# Patient Record
Sex: MALE | Race: OTHER | NOT HISPANIC OR LATINO
[De-identification: names, ages, dates, MRNs, and addresses within clinical notes are randomized per-mention and may not be internally consistent; named-entity substitution may affect disease eponyms.]

---

## 2017-01-13 ENCOUNTER — TRANSCRIPTION ENCOUNTER (OUTPATIENT)
Age: 62
End: 2017-01-13

## 2017-03-16 ENCOUNTER — OUTPATIENT (OUTPATIENT)
Dept: OUTPATIENT SERVICES | Facility: HOSPITAL | Age: 62
LOS: 1 days | End: 2017-03-16

## 2017-03-16 ENCOUNTER — APPOINTMENT (OUTPATIENT)
Dept: CV DIAGNOSITCS | Facility: HOSPITAL | Age: 62
End: 2017-03-16

## 2017-03-16 DIAGNOSIS — I10 ESSENTIAL (PRIMARY) HYPERTENSION: ICD-10-CM

## 2017-03-16 DIAGNOSIS — I77.810 THORACIC AORTIC ECTASIA: ICD-10-CM

## 2017-03-20 ENCOUNTER — APPOINTMENT (OUTPATIENT)
Dept: OTOLARYNGOLOGY | Facility: CLINIC | Age: 62
End: 2017-03-20

## 2017-03-20 VITALS
HEART RATE: 73 BPM | OXYGEN SATURATION: 98 % | HEIGHT: 68 IN | BODY MASS INDEX: 26.83 KG/M2 | TEMPERATURE: 97.9 F | WEIGHT: 177 LBS | DIASTOLIC BLOOD PRESSURE: 80 MMHG | SYSTOLIC BLOOD PRESSURE: 142 MMHG

## 2017-03-20 DIAGNOSIS — Z83.3 FAMILY HISTORY OF DIABETES MELLITUS: ICD-10-CM

## 2017-03-20 DIAGNOSIS — Z80.9 FAMILY HISTORY OF MALIGNANT NEOPLASM, UNSPECIFIED: ICD-10-CM

## 2017-03-20 DIAGNOSIS — H72.91 UNSPECIFIED PERFORATION OF TYMPANIC MEMBRANE, RIGHT EAR: ICD-10-CM

## 2017-03-20 DIAGNOSIS — I10 ESSENTIAL (PRIMARY) HYPERTENSION: ICD-10-CM

## 2017-03-20 DIAGNOSIS — Z78.9 OTHER SPECIFIED HEALTH STATUS: ICD-10-CM

## 2017-03-20 DIAGNOSIS — H92.11 OTORRHEA, RIGHT EAR: ICD-10-CM

## 2017-03-20 RX ORDER — TELMISARTAN AND HYDROCHLOROTHIAZIDE 80; 25 MG/1; MG/1
TABLET ORAL
Refills: 0 | Status: ACTIVE | COMMUNITY

## 2017-03-20 RX ORDER — ASPIRIN 81 MG/1
81 TABLET, DELAYED RELEASE ORAL
Refills: 0 | Status: ACTIVE | COMMUNITY

## 2017-03-29 ENCOUNTER — APPOINTMENT (OUTPATIENT)
Dept: PULMONOLOGY | Facility: CLINIC | Age: 62
End: 2017-03-29

## 2017-03-29 VITALS
WEIGHT: 177 LBS | DIASTOLIC BLOOD PRESSURE: 80 MMHG | HEIGHT: 68 IN | TEMPERATURE: 97.4 F | RESPIRATION RATE: 16 BRPM | OXYGEN SATURATION: 96 % | HEART RATE: 90 BPM | SYSTOLIC BLOOD PRESSURE: 131 MMHG | BODY MASS INDEX: 26.83 KG/M2

## 2017-03-29 DIAGNOSIS — Z86.11 PERSONAL HISTORY OF TUBERCULOSIS: ICD-10-CM

## 2017-03-29 DIAGNOSIS — Z87.891 PERSONAL HISTORY OF NICOTINE DEPENDENCE: ICD-10-CM

## 2017-04-04 ENCOUNTER — APPOINTMENT (OUTPATIENT)
Dept: OTOLARYNGOLOGY | Facility: CLINIC | Age: 62
End: 2017-04-04

## 2017-05-02 ENCOUNTER — APPOINTMENT (OUTPATIENT)
Dept: OTOLARYNGOLOGY | Facility: CLINIC | Age: 62
End: 2017-05-02

## 2017-05-04 ENCOUNTER — APPOINTMENT (OUTPATIENT)
Dept: PULMONOLOGY | Facility: CLINIC | Age: 62
End: 2017-05-04

## 2017-05-16 ENCOUNTER — APPOINTMENT (OUTPATIENT)
Dept: SLEEP CENTER | Facility: CLINIC | Age: 62
End: 2017-05-16

## 2017-05-16 ENCOUNTER — OUTPATIENT (OUTPATIENT)
Dept: OUTPATIENT SERVICES | Facility: HOSPITAL | Age: 62
LOS: 1 days | End: 2017-05-16
Payer: COMMERCIAL

## 2017-05-16 PROCEDURE — 95810 POLYSOM 6/> YRS 4/> PARAM: CPT

## 2017-05-17 DIAGNOSIS — G47.33 OBSTRUCTIVE SLEEP APNEA (ADULT) (PEDIATRIC): ICD-10-CM

## 2017-06-08 ENCOUNTER — APPOINTMENT (OUTPATIENT)
Dept: SLEEP CENTER | Facility: CLINIC | Age: 62
End: 2017-06-08

## 2017-06-08 ENCOUNTER — OUTPATIENT (OUTPATIENT)
Dept: OUTPATIENT SERVICES | Facility: HOSPITAL | Age: 62
LOS: 1 days | End: 2017-06-08
Payer: COMMERCIAL

## 2017-06-08 PROCEDURE — 95811 POLYSOM 6/>YRS CPAP 4/> PARM: CPT

## 2017-06-09 DIAGNOSIS — G47.33 OBSTRUCTIVE SLEEP APNEA (ADULT) (PEDIATRIC): ICD-10-CM

## 2017-07-10 ENCOUNTER — RESULT REVIEW (OUTPATIENT)
Age: 62
End: 2017-07-10

## 2017-11-08 ENCOUNTER — APPOINTMENT (OUTPATIENT)
Dept: PULMONOLOGY | Facility: CLINIC | Age: 62
End: 2017-11-08
Payer: COMMERCIAL

## 2017-11-08 VITALS
DIASTOLIC BLOOD PRESSURE: 89 MMHG | SYSTOLIC BLOOD PRESSURE: 129 MMHG | HEART RATE: 72 BPM | WEIGHT: 174 LBS | RESPIRATION RATE: 16 BRPM | TEMPERATURE: 97.7 F | BODY MASS INDEX: 26.37 KG/M2 | HEIGHT: 68 IN | OXYGEN SATURATION: 98 %

## 2017-11-08 DIAGNOSIS — G47.31 PRIMARY CENTRAL SLEEP APNEA: ICD-10-CM

## 2017-11-08 PROCEDURE — 99214 OFFICE O/P EST MOD 30 MIN: CPT | Mod: GC

## 2018-02-08 ENCOUNTER — APPOINTMENT (OUTPATIENT)
Dept: OPHTHALMOLOGY | Facility: CLINIC | Age: 63
End: 2018-02-08
Payer: COMMERCIAL

## 2018-02-08 DIAGNOSIS — H25.012 CORTICAL AGE-RELATED CATARACT, LEFT EYE: ICD-10-CM

## 2018-02-08 PROCEDURE — 92004 COMPRE OPH EXAM NEW PT 1/>: CPT

## 2018-05-09 ENCOUNTER — APPOINTMENT (OUTPATIENT)
Dept: PULMONOLOGY | Facility: CLINIC | Age: 63
End: 2018-05-09
Payer: COMMERCIAL

## 2018-05-09 VITALS
HEIGHT: 68 IN | WEIGHT: 171 LBS | BODY MASS INDEX: 25.91 KG/M2 | RESPIRATION RATE: 18 BRPM | OXYGEN SATURATION: 96 % | SYSTOLIC BLOOD PRESSURE: 140 MMHG | DIASTOLIC BLOOD PRESSURE: 70 MMHG | HEART RATE: 74 BPM | TEMPERATURE: 97.3 F

## 2018-05-09 PROCEDURE — 99214 OFFICE O/P EST MOD 30 MIN: CPT | Mod: GC

## 2018-05-09 RX ORDER — NEOMYCIN/DEXAMETHASONE SOD PH 0.35-0.1%
DROPS OPHTHALMIC (EYE)
Refills: 0 | Status: DISCONTINUED | COMMUNITY
End: 2018-05-09

## 2018-06-15 ENCOUNTER — RX RENEWAL (OUTPATIENT)
Age: 63
End: 2018-06-15

## 2018-08-16 ENCOUNTER — APPOINTMENT (OUTPATIENT)
Dept: PULMONOLOGY | Facility: CLINIC | Age: 63
End: 2018-08-16
Payer: COMMERCIAL

## 2018-08-16 VITALS
HEIGHT: 68 IN | SYSTOLIC BLOOD PRESSURE: 130 MMHG | TEMPERATURE: 98 F | DIASTOLIC BLOOD PRESSURE: 80 MMHG | BODY MASS INDEX: 24.71 KG/M2 | OXYGEN SATURATION: 97 % | HEART RATE: 84 BPM | RESPIRATION RATE: 16 BRPM | WEIGHT: 163 LBS

## 2018-08-16 PROCEDURE — 99214 OFFICE O/P EST MOD 30 MIN: CPT | Mod: GC

## 2018-08-23 ENCOUNTER — APPOINTMENT (OUTPATIENT)
Dept: GASTROENTEROLOGY | Facility: CLINIC | Age: 63
End: 2018-08-23
Payer: COMMERCIAL

## 2018-08-23 VITALS
HEART RATE: 72 BPM | TEMPERATURE: 96.5 F | HEIGHT: 68 IN | SYSTOLIC BLOOD PRESSURE: 140 MMHG | WEIGHT: 167 LBS | BODY MASS INDEX: 25.31 KG/M2 | OXYGEN SATURATION: 98 % | DIASTOLIC BLOOD PRESSURE: 80 MMHG

## 2018-08-23 DIAGNOSIS — Z12.11 ENCOUNTER FOR SCREENING FOR MALIGNANT NEOPLASM OF COLON: ICD-10-CM

## 2018-08-23 PROCEDURE — 99244 OFF/OP CNSLTJ NEW/EST MOD 40: CPT

## 2018-10-15 ENCOUNTER — APPOINTMENT (OUTPATIENT)
Dept: DERMATOLOGY | Facility: CLINIC | Age: 63
End: 2018-10-15
Payer: COMMERCIAL

## 2018-10-15 VITALS
BODY MASS INDEX: 25.76 KG/M2 | HEIGHT: 68 IN | DIASTOLIC BLOOD PRESSURE: 80 MMHG | WEIGHT: 170 LBS | SYSTOLIC BLOOD PRESSURE: 120 MMHG

## 2018-10-15 DIAGNOSIS — L84 CORNS AND CALLOSITIES: ICD-10-CM

## 2018-10-15 DIAGNOSIS — L40.9 PSORIASIS, UNSPECIFIED: ICD-10-CM

## 2018-10-15 DIAGNOSIS — L72.9 FOLLICULAR CYST OF THE SKIN AND SUBCUTANEOUS TISSUE, UNSPECIFIED: ICD-10-CM

## 2018-10-15 PROCEDURE — 99203 OFFICE O/P NEW LOW 30 MIN: CPT

## 2018-10-15 RX ORDER — FLUOCINONIDE 0.5 MG/ML
0.05 SOLUTION TOPICAL
Qty: 1 | Refills: 3 | Status: ACTIVE | COMMUNITY
Start: 2018-10-15 | End: 1900-01-01

## 2018-10-15 RX ORDER — KETOCONAZOLE 20.5 MG/ML
2 SHAMPOO, SUSPENSION TOPICAL
Qty: 1 | Refills: 3 | Status: ACTIVE | COMMUNITY
Start: 2018-10-15 | End: 1900-01-01

## 2018-11-21 ENCOUNTER — APPOINTMENT (OUTPATIENT)
Dept: PULMONOLOGY | Facility: CLINIC | Age: 63
End: 2018-11-21
Payer: COMMERCIAL

## 2018-11-21 VITALS
HEART RATE: 65 BPM | BODY MASS INDEX: 24.86 KG/M2 | OXYGEN SATURATION: 97 % | TEMPERATURE: 97.4 F | RESPIRATION RATE: 15 BRPM | WEIGHT: 164 LBS | SYSTOLIC BLOOD PRESSURE: 139 MMHG | DIASTOLIC BLOOD PRESSURE: 78 MMHG | HEIGHT: 68 IN

## 2018-11-21 PROCEDURE — 99214 OFFICE O/P EST MOD 30 MIN: CPT | Mod: GC

## 2018-11-26 ENCOUNTER — APPOINTMENT (OUTPATIENT)
Dept: GASTROENTEROLOGY | Facility: HOSPITAL | Age: 63
End: 2018-11-26

## 2018-11-26 ENCOUNTER — OUTPATIENT (OUTPATIENT)
Dept: OUTPATIENT SERVICES | Facility: HOSPITAL | Age: 63
LOS: 1 days | End: 2018-11-26
Payer: COMMERCIAL

## 2018-11-26 DIAGNOSIS — Z12.11 ENCOUNTER FOR SCREENING FOR MALIGNANT NEOPLASM OF COLON: ICD-10-CM

## 2018-11-26 PROCEDURE — 45378 DIAGNOSTIC COLONOSCOPY: CPT

## 2019-01-14 ENCOUNTER — APPOINTMENT (OUTPATIENT)
Dept: DERMATOLOGY | Facility: CLINIC | Age: 64
End: 2019-01-14

## 2019-04-10 ENCOUNTER — APPOINTMENT (OUTPATIENT)
Dept: PULMONOLOGY | Facility: CLINIC | Age: 64
End: 2019-04-10

## 2019-11-11 ENCOUNTER — APPOINTMENT (OUTPATIENT)
Dept: PULMONOLOGY | Facility: CLINIC | Age: 64
End: 2019-11-11
Payer: COMMERCIAL

## 2019-11-11 VITALS
SYSTOLIC BLOOD PRESSURE: 129 MMHG | OXYGEN SATURATION: 94 % | WEIGHT: 168.13 LBS | HEART RATE: 78 BPM | BODY MASS INDEX: 25.56 KG/M2 | RESPIRATION RATE: 16 BRPM | TEMPERATURE: 98 F | DIASTOLIC BLOOD PRESSURE: 80 MMHG

## 2019-11-11 DIAGNOSIS — F51.12 INSUFFICIENT SLEEP SYNDROME: ICD-10-CM

## 2019-11-11 DIAGNOSIS — G47.39 OTHER SLEEP APNEA: ICD-10-CM

## 2019-11-11 DIAGNOSIS — G47.37 CENTRAL SLEEP APNEA IN CONDITIONS CLASSIFIED ELSEWHERE: ICD-10-CM

## 2019-11-11 PROCEDURE — 99214 OFFICE O/P EST MOD 30 MIN: CPT

## 2019-11-11 RX ORDER — SODIUM SULFATE, POTASSIUM SULFATE, MAGNESIUM SULFATE 17.5; 3.13; 1.6 G/ML; G/ML; G/ML
17.5-3.13-1.6 SOLUTION, CONCENTRATE ORAL
Qty: 1 | Refills: 0 | Status: DISCONTINUED | COMMUNITY
Start: 2018-08-23 | End: 2019-11-11

## 2019-11-11 NOTE — REVIEW OF SYSTEMS
[Fatigue] : fatigue [Arthralgias] : arthralgias [Unintentional Sleep while inactive] : unintentional sleep while inactive [Nasal Congestion] : no nasal congestion [Witnessed Apneas] : no witnessed apnea [Postnasal Drip] : no postnasal drip [Shortness Of Breath] : no shortness of breath [Chest Pain] : no chest pain [CHF] : no congestive heart failure [Palpitations] : no palpitations [Thyroid Disease] : no thyroid disease [A.M. Headache] : no headache present upon awakening [History of Iron Deficiency] : no history of iron deficiency [Diabetes] : no diabetes  [Heartburn] : no heartburn [Nocturia] : no nocturia [Depression] : no depression [Anxious] : not anxious [Snoring] : no snoring [Unintentional Sleep while active] : no unintentional sleep while active [Awakes Unrefreshed] : restorative sleep [Awakes With Headache] : awakes without a headache [Awakes With Dry Mouth] : awakes without dry mouth [Recent Wt Loss (___ Lbs)] : no recent weight loss [Recent Wt Gain (___ Lbs)] : no recent weight gain [Difficulty Maintaining Sleep] : no difficulty maintaining sleep [Lower Extremity Discomfort] : no lower extremity discomfort [Difficulty Initiating Sleep] : no difficulty falling asleep [Irresistible urge to move legs] : no irresistible urge to move legs because of lower extremity discomfort [Unusual Sleep Behavior] : no unusual sleep behavior [Cataplexy] :  no cataplexy [Hypnogogic Hallucinations] : no hypnogogic hallucinations [Sleep Paralysis] : no sleep paralysis [Hypnopompic Hallucinations] : no hypnopompic hallucinations [FreeTextEntry1] : 11 pm [FreeTextEntry2] : 3 am [FreeTextEntry3] : less than 5 minutes [FreeTextEntry4] : 1-2 [FreeTextEntry5] : "easily" [FreeTextEntry7] : 12 pm daily naps for 1-hour with CPAP, refreshing [FreeTextEntry6] : 4-5 hours

## 2019-11-11 NOTE — PHYSICAL EXAM
[General Appearance - In No Acute Distress] : no acute distress [Normal Appearance] : normal appearance [Normal Conjunctiva] : the conjunctiva exhibited no abnormalities [III] : III [Neck Appearance] : the appearance of the neck was normal [Heart Rate And Rhythm] : heart rate was normal and rhythm regular [Murmurs] : no murmurs [] : no respiratory distress [Respiration, Rhythm And Depth] : normal respiratory rhythm and effort [Exaggerated Use Of Accessory Muscles For Inspiration] : no accessory muscle use [Auscultation Breath Sounds / Voice Sounds] : lungs were clear to auscultation bilaterally [Involuntary Movements] : no involuntary movements were seen [Cyanosis, Localized] : no localized cyanosis [No Focal Deficits] : no focal deficits [Oriented To Time, Place, And Person] : oriented to person, place, and time [Affect] : the affect was normal [Mood] : the mood was normal [FreeTextEntry2] : no edema present

## 2019-11-11 NOTE — HISTORY OF PRESENT ILLNESS
[ESS: ___] : ESS score [unfilled] [ASV w/Auto EPAP: ____ cmH2O] : ASV with Auto EPAP: [unfilled] cmH2O [Date: ___] : the most recent therapeutic polysomnogram was completed [unfilled] [FreeTextEntry1] : This is a 64 year old male with HTN, and history of mixed severe apneas, on ASV here for an annual follow up visit.\par \par PSG (5/16/2017)) showed an AHI of 55.7/hr, ALIS 29.9, EMILY 7.1; with a T-90 of 44%. He is currently on ASV therapy, EPAP of 4-15 cmH20, and PS of 0-10 cmH20. He is using ASV on a nightly basis for the duration of the night with continued improvement in sleep quality, and daytime functional status. Although he likes his nasal pillows mask, he wakes us with a red left eye" due to mask leak. The pressures are well tolerated. [employer] [facility] is providing a renewal of supplies regularly. 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 He acknowledges that he does not sleep enough hours and reports associated drowsy driving and unintentional sleep episodes throughout the day primarily while inactive. No associated MVA reported. He tries to nap every 2-hours during the day. He feels refreshed when he is able to sleep 7 hours/night but can rarely achieve this due to his care-giver responsibilities. \par \par No interim changes to his health and weight reported. [Nocturnal Oxygen] : The patient does not use nocturnal oxygen

## 2019-11-11 NOTE — ASSESSMENT
[FreeTextEntry1] : 64 year old male with comorbid HTN presents for annual follow up visit to continue ASV therapy for severe apneas (AHI 55.7/hr; ALIS 29.9; EMILY 7.1).\par \par Therapeutic and compliance data download reveals usage 90 % of nights with an average use of 6 hours and 16 minutes. Therapy based AHI is 6.9/hr on ASV therapy EPAP (4-15) PS (0-10) cm H2O. \par Significant mask leak noted consistent with patient's reports. \par \par -The patient is experiencing benefit from ASV and should continue to use nightly as prescribed.\par -Mask fitting was done today because of mask leak complaints that he reported and that were evident on the data download. . He was provided with a Brevida nasal pillows mask to try at home and was advised to notify us if he still experiences mask leak and/or discomfort. \par \par -Insufficient Sleep Time: He was asked to increase his sleep time as he is only getting 4-5 hours of sleep per night by going to bed earlier, as this is likely causing his residual sleepiness. He was encouraged to continue with naps throughout the day as needed. \par -The patient was cautioned on the importance of avoiding drowsy driving. \par \par The patient acknowledges the importance in treating his apneas for symptomatic relief, quality of life improvement, and to decrease medical risks  \par He will follow up in 1 year or sooner if needed.

## 2020-01-13 ENCOUNTER — APPOINTMENT (OUTPATIENT)
Dept: OPHTHALMOLOGY | Facility: CLINIC | Age: 65
End: 2020-01-13
Payer: COMMERCIAL

## 2020-01-13 ENCOUNTER — NON-APPOINTMENT (OUTPATIENT)
Age: 65
End: 2020-01-13

## 2020-01-13 PROCEDURE — 92250 FUNDUS PHOTOGRAPHY W/I&R: CPT

## 2020-01-13 PROCEDURE — 92014 COMPRE OPH EXAM EST PT 1/>: CPT

## 2020-02-10 ENCOUNTER — APPOINTMENT (OUTPATIENT)
Dept: OPHTHALMOLOGY | Facility: CLINIC | Age: 65
End: 2020-02-10

## 2020-02-10 ENCOUNTER — APPOINTMENT (OUTPATIENT)
Dept: OPHTHALMOLOGY | Facility: CLINIC | Age: 65
End: 2020-02-10
Payer: COMMERCIAL

## 2020-02-10 ENCOUNTER — NON-APPOINTMENT (OUTPATIENT)
Age: 65
End: 2020-02-10

## 2020-02-10 PROCEDURE — 92134 CPTRZ OPH DX IMG PST SGM RTA: CPT

## 2020-02-10 PROCEDURE — 92012 INTRM OPH EXAM EST PATIENT: CPT

## 2020-02-10 PROCEDURE — 92201 OPSCPY EXTND RTA DRAW UNI/BI: CPT

## 2020-03-10 ENCOUNTER — APPOINTMENT (OUTPATIENT)
Dept: OPHTHALMOLOGY | Facility: CLINIC | Age: 65
End: 2020-03-10
Payer: COMMERCIAL

## 2020-03-10 ENCOUNTER — NON-APPOINTMENT (OUTPATIENT)
Age: 65
End: 2020-03-10

## 2020-03-10 PROCEDURE — 92136 OPHTHALMIC BIOMETRY: CPT

## 2020-03-10 PROCEDURE — 92014 COMPRE OPH EXAM EST PT 1/>: CPT

## 2020-03-24 ENCOUNTER — APPOINTMENT (OUTPATIENT)
Dept: OPHTHALMOLOGY | Facility: CLINIC | Age: 65
End: 2020-03-24

## 2020-04-02 ENCOUNTER — APPOINTMENT (OUTPATIENT)
Dept: OPHTHALMOLOGY | Facility: AMBULATORY SURGERY CENTER | Age: 65
End: 2020-04-02

## 2020-04-03 ENCOUNTER — APPOINTMENT (OUTPATIENT)
Dept: OPHTHALMOLOGY | Facility: CLINIC | Age: 65
End: 2020-04-03

## 2020-04-09 ENCOUNTER — APPOINTMENT (OUTPATIENT)
Dept: OPHTHALMOLOGY | Facility: CLINIC | Age: 65
End: 2020-04-09

## 2020-08-02 DIAGNOSIS — Z01.818 ENCOUNTER FOR OTHER PREPROCEDURAL EXAMINATION: ICD-10-CM

## 2020-08-03 ENCOUNTER — APPOINTMENT (OUTPATIENT)
Dept: DISASTER EMERGENCY | Facility: CLINIC | Age: 65
End: 2020-08-03

## 2020-08-06 ENCOUNTER — APPOINTMENT (OUTPATIENT)
Dept: OPHTHALMOLOGY | Facility: AMBULATORY SURGERY CENTER | Age: 65
End: 2020-08-06
Payer: COMMERCIAL

## 2020-08-06 ENCOUNTER — NON-APPOINTMENT (OUTPATIENT)
Age: 65
End: 2020-08-06

## 2020-08-06 PROCEDURE — 66984 XCAPSL CTRC RMVL W/O ECP: CPT | Mod: LT

## 2020-08-07 ENCOUNTER — APPOINTMENT (OUTPATIENT)
Dept: OPHTHALMOLOGY | Facility: CLINIC | Age: 65
End: 2020-08-07
Payer: COMMERCIAL

## 2020-08-07 ENCOUNTER — NON-APPOINTMENT (OUTPATIENT)
Age: 65
End: 2020-08-07

## 2020-08-07 PROCEDURE — 99024 POSTOP FOLLOW-UP VISIT: CPT

## 2020-08-13 ENCOUNTER — APPOINTMENT (OUTPATIENT)
Dept: OPHTHALMOLOGY | Facility: CLINIC | Age: 65
End: 2020-08-13
Payer: COMMERCIAL

## 2020-08-13 ENCOUNTER — NON-APPOINTMENT (OUTPATIENT)
Age: 65
End: 2020-08-13

## 2020-08-13 PROCEDURE — 99024 POSTOP FOLLOW-UP VISIT: CPT

## 2020-09-08 ENCOUNTER — NON-APPOINTMENT (OUTPATIENT)
Age: 65
End: 2020-09-08

## 2020-09-08 ENCOUNTER — APPOINTMENT (OUTPATIENT)
Dept: OPHTHALMOLOGY | Facility: CLINIC | Age: 65
End: 2020-09-08
Payer: COMMERCIAL

## 2020-09-08 PROCEDURE — 99024 POSTOP FOLLOW-UP VISIT: CPT

## 2020-10-19 ENCOUNTER — NON-APPOINTMENT (OUTPATIENT)
Age: 65
End: 2020-10-19

## 2020-10-19 ENCOUNTER — APPOINTMENT (OUTPATIENT)
Dept: OPHTHALMOLOGY | Facility: CLINIC | Age: 65
End: 2020-10-19
Payer: COMMERCIAL

## 2020-10-19 PROCEDURE — 92134 CPTRZ OPH DX IMG PST SGM RTA: CPT

## 2020-10-19 PROCEDURE — 92012 INTRM OPH EXAM EST PATIENT: CPT | Mod: 24

## 2020-10-19 PROCEDURE — 99072 ADDL SUPL MATRL&STAF TM PHE: CPT

## 2020-12-16 PROBLEM — Z12.11 ENCOUNTER FOR SCREENING COLONOSCOPY: Status: RESOLVED | Noted: 2018-08-23 | Resolved: 2020-12-16

## 2021-01-25 ENCOUNTER — APPOINTMENT (OUTPATIENT)
Dept: PEDIATRIC ALLERGY IMMUNOLOGY | Facility: CLINIC | Age: 66
End: 2021-01-25

## 2021-06-21 ENCOUNTER — APPOINTMENT (OUTPATIENT)
Dept: OPHTHALMOLOGY | Facility: CLINIC | Age: 66
End: 2021-06-21
Payer: COMMERCIAL

## 2021-06-21 ENCOUNTER — NON-APPOINTMENT (OUTPATIENT)
Age: 66
End: 2021-06-21

## 2021-06-21 PROCEDURE — 92134 CPTRZ OPH DX IMG PST SGM RTA: CPT

## 2021-06-21 PROCEDURE — 99072 ADDL SUPL MATRL&STAF TM PHE: CPT

## 2021-06-21 PROCEDURE — 92014 COMPRE OPH EXAM EST PT 1/>: CPT

## 2021-10-06 PROBLEM — I10 ESSENTIAL HYPERTENSION: Status: ACTIVE | Noted: 2017-03-20

## 2022-07-13 ENCOUNTER — APPOINTMENT (OUTPATIENT)
Dept: PULMONOLOGY | Facility: CLINIC | Age: 67
End: 2022-07-13

## 2022-07-13 VITALS
RESPIRATION RATE: 15 BRPM | WEIGHT: 175 LBS | TEMPERATURE: 98 F | HEIGHT: 68 IN | HEART RATE: 67 BPM | BODY MASS INDEX: 26.52 KG/M2 | DIASTOLIC BLOOD PRESSURE: 83 MMHG | OXYGEN SATURATION: 97 % | SYSTOLIC BLOOD PRESSURE: 150 MMHG

## 2022-07-13 DIAGNOSIS — G47.33 OBSTRUCTIVE SLEEP APNEA (ADULT) (PEDIATRIC): ICD-10-CM

## 2022-07-13 PROCEDURE — 99213 OFFICE O/P EST LOW 20 MIN: CPT | Mod: GC

## 2022-07-13 NOTE — ASSESSMENT
[FreeTextEntry1] : 64 year old male with comorbid HTN presents for annual follow up visit to continue ASV therapy for severe apneas (AHI 55.7/hr; ALIS 29.9; EMILY 7.1).\par \par Therapeutic and compliance data download reveals usage 100% of nights with an average use of 6 hours and 7 minutes. Therapy based AHI is 9.7/hr on ASV therapy EPAP (4-15) PS (0-10) cm H2O. Significant mask leak again noted consistent with patient's reports. He is currently using nasal pillows.\par \par - The patient is experiencing benefit from ASV and should continue to use nightly as prescribed.\par - He will schedule a mask fitting to try a hybrid full face mask to try to alleviate the leak. Even with the leak, his residual AHI is much improved from 2017.\par \par -Insufficient Sleep Time: He was asked to increase his sleep time as he is only getting 4-5 hours of sleep per night by going to bed earlier, as this is likely causing his residual sleepiness. He was encouraged to continue with naps throughout the day as needed. \par -The patient was cautioned on the importance of avoiding drowsy driving.

## 2022-07-13 NOTE — HISTORY OF PRESENT ILLNESS
[Date: ___] : the most recent therapeutic polysomnogram was completed [unfilled] [ASV w/Auto EPAP: ____ cmH2O] : ASV with Auto EPAP: [unfilled] cmH2O [FreeTextEntry1] : This is a 64 year old male with HTN, and history of mixed severe apneas, on ASV here for an annual follow up visit.\par \par He is currently on ASV therapy, EPAP of 4-15 cmH20, and PS of 0-10 cmH20. He is using ASV on a nightly basis for the duration of the night with continued improvement in sleep quality, and daytime functional status. Although he likes his nasal pillows mask, he does notice a significant air leak occasionally, and he thinks he sometimes opens his mouth when he's sleeping. He also states that he does get tired during the day. The quality of his sleep is good, however the duration is suboptimal. Reports that his sleep schedules are very variable, mostly due to work and personal-related problems. Says he has "too much to do" during the day, and because of this his sleep time suffers.\par \par PSG (5/16/2017) showed an AHI of 55.7/hr, ALIS 29.9, EMILY 7.1; with a T-90 of 44% \par \par Machine: Aircurve 10 ASV\par DME: [facility] [Nocturnal Oxygen] : The patient does not use nocturnal oxygen

## 2022-07-13 NOTE — REVIEW OF SYSTEMS
[Fatigue] : fatigue [Arthralgias] : arthralgias [Unintentional Sleep while inactive] : unintentional sleep while inactive [Nasal Congestion] : no nasal congestion [Postnasal Drip] : no postnasal drip [Witnessed Apneas] : no witnessed apnea [Shortness Of Breath] : no shortness of breath [Chest Pain] : no chest pain [Palpitations] : no palpitations [CHF] : no congestive heart failure [Thyroid Disease] : no thyroid disease [Diabetes] : no diabetes  [History of Iron Deficiency] : no history of iron deficiency [A.M. Headache] : no headache present upon awakening [Heartburn] : no heartburn [Nocturia] : no nocturia [Depression] : no depression [Anxious] : not anxious [Snoring] : no snoring [Unintentional Sleep while active] : no unintentional sleep while active [Awakes Unrefreshed] : restorative sleep [Awakes With Headache] : awakes without a headache [Awakes With Dry Mouth] : awakes without dry mouth [Recent Wt Loss (___ Lbs)] : no recent weight loss [Recent Wt Gain (___ Lbs)] : no recent weight gain [Difficulty Initiating Sleep] : no difficulty falling asleep [Difficulty Maintaining Sleep] : no difficulty maintaining sleep [Lower Extremity Discomfort] : no lower extremity discomfort [Irresistible urge to move legs] : no irresistible urge to move legs because of lower extremity discomfort [Unusual Sleep Behavior] : no unusual sleep behavior [Cataplexy] :  no cataplexy [Hypnogogic Hallucinations] : no hypnogogic hallucinations [Hypnopompic Hallucinations] : no hypnopompic hallucinations [Sleep Paralysis] : no sleep paralysis [FreeTextEntry1] : 11 pm [FreeTextEntry2] : 3 am [FreeTextEntry3] : less than 5 minutes [FreeTextEntry4] : 1-2 [FreeTextEntry5] : "easily" [FreeTextEntry6] : 4-5 hours [FreeTextEntry7] : 12 pm daily naps for 1-hour with CPAP, refreshing

## 2022-07-13 NOTE — PHYSICAL EXAM
[Normal Appearance] : normal appearance [General Appearance - In No Acute Distress] : no acute distress [Normal Conjunctiva] : the conjunctiva exhibited no abnormalities [III] : III [Neck Appearance] : the appearance of the neck was normal [Heart Rate And Rhythm] : heart rate was normal and rhythm regular [Murmurs] : no murmurs [] : no respiratory distress [Respiration, Rhythm And Depth] : normal respiratory rhythm and effort [Exaggerated Use Of Accessory Muscles For Inspiration] : no accessory muscle use [Auscultation Breath Sounds / Voice Sounds] : lungs were clear to auscultation bilaterally [Involuntary Movements] : no involuntary movements were seen [Cyanosis, Localized] : no localized cyanosis [No Focal Deficits] : no focal deficits [Oriented To Time, Place, And Person] : oriented to person, place, and time [Affect] : the affect was normal [Mood] : the mood was normal [FreeTextEntry2] : no edema present

## 2022-07-18 ENCOUNTER — NON-APPOINTMENT (OUTPATIENT)
Age: 67
End: 2022-07-18

## 2022-07-18 ENCOUNTER — APPOINTMENT (OUTPATIENT)
Dept: OPHTHALMOLOGY | Facility: CLINIC | Age: 67
End: 2022-07-18

## 2022-07-18 PROCEDURE — 92014 COMPRE OPH EXAM EST PT 1/>: CPT

## 2022-07-18 PROCEDURE — 92250 FUNDUS PHOTOGRAPHY W/I&R: CPT

## 2022-07-27 ENCOUNTER — APPOINTMENT (OUTPATIENT)
Dept: PULMONOLOGY | Facility: CLINIC | Age: 67
End: 2022-07-27

## 2022-07-27 VITALS
HEART RATE: 62 BPM | TEMPERATURE: 98 F | BODY MASS INDEX: 26.52 KG/M2 | RESPIRATION RATE: 16 BRPM | WEIGHT: 175 LBS | DIASTOLIC BLOOD PRESSURE: 72 MMHG | SYSTOLIC BLOOD PRESSURE: 158 MMHG | HEIGHT: 68 IN | OXYGEN SATURATION: 98 %

## 2022-07-27 DIAGNOSIS — G47.31 PRIMARY CENTRAL SLEEP APNEA: ICD-10-CM

## 2022-07-27 PROCEDURE — 99214 OFFICE O/P EST MOD 30 MIN: CPT

## 2022-07-27 NOTE — PROCEDURE
[FreeTextEntry1] : Patient is currently on ASV and using a nasal mask. He reports mouth leak and high residual AHI.\par \par He was fitted today with a Respironics Dreamwear Full Face Medium cushion Medium frame. Patient found the mask very comfortable and tolerated it well. He was instructed on how to don/doff and adjust the mask for potential leak. Patient was advised to try the mask at home and follow up in 1-2 weeks. If he likes the mask, will place order to DME.

## 2024-07-15 ENCOUNTER — APPOINTMENT (OUTPATIENT)
Dept: OPHTHALMOLOGY | Facility: CLINIC | Age: 69
End: 2024-07-15
Payer: COMMERCIAL

## 2024-07-15 ENCOUNTER — NON-APPOINTMENT (OUTPATIENT)
Age: 69
End: 2024-07-15

## 2024-07-15 PROCEDURE — 92134 CPTRZ OPH DX IMG PST SGM RTA: CPT

## 2024-07-15 PROCEDURE — 92014 COMPRE OPH EXAM EST PT 1/>: CPT

## 2024-12-11 ENCOUNTER — APPOINTMENT (OUTPATIENT)
Dept: PULMONOLOGY | Facility: CLINIC | Age: 69
End: 2024-12-11
Payer: COMMERCIAL

## 2024-12-11 VITALS
OXYGEN SATURATION: 96 % | BODY MASS INDEX: 25.76 KG/M2 | SYSTOLIC BLOOD PRESSURE: 176 MMHG | HEIGHT: 68 IN | TEMPERATURE: 95.7 F | DIASTOLIC BLOOD PRESSURE: 82 MMHG | HEART RATE: 58 BPM | WEIGHT: 170 LBS

## 2024-12-11 DIAGNOSIS — G47.31 PRIMARY CENTRAL SLEEP APNEA: ICD-10-CM

## 2024-12-11 DIAGNOSIS — Z87.891 PERSONAL HISTORY OF NICOTINE DEPENDENCE: ICD-10-CM

## 2024-12-11 DIAGNOSIS — G47.33 OBSTRUCTIVE SLEEP APNEA (ADULT) (PEDIATRIC): ICD-10-CM

## 2024-12-11 DIAGNOSIS — R06.2 WHEEZING: ICD-10-CM

## 2024-12-11 PROCEDURE — 99214 OFFICE O/P EST MOD 30 MIN: CPT

## 2024-12-11 PROCEDURE — G2211 COMPLEX E/M VISIT ADD ON: CPT | Mod: NC

## 2024-12-11 RX ORDER — ATENOLOL 50 MG/1
50 TABLET ORAL DAILY
Refills: 0 | Status: ACTIVE | COMMUNITY

## 2024-12-31 ENCOUNTER — APPOINTMENT (OUTPATIENT)
Dept: PULMONOLOGY | Facility: CLINIC | Age: 69
End: 2024-12-31
Payer: COMMERCIAL

## 2024-12-31 PROCEDURE — 94729 DIFFUSING CAPACITY: CPT

## 2024-12-31 PROCEDURE — 94726 PLETHYSMOGRAPHY LUNG VOLUMES: CPT

## 2024-12-31 PROCEDURE — 94060 EVALUATION OF WHEEZING: CPT

## 2025-01-03 DIAGNOSIS — J45.20 MILD INTERMITTENT ASTHMA, UNCOMPLICATED: ICD-10-CM

## 2025-01-03 RX ORDER — ALBUTEROL SULFATE 90 UG/1
108 (90 BASE) INHALANT RESPIRATORY (INHALATION)
Qty: 8.5 | Refills: 3 | Status: ACTIVE | COMMUNITY
Start: 2025-01-03 | End: 1900-01-01

## 2025-04-11 ENCOUNTER — APPOINTMENT (OUTPATIENT)
Dept: CARDIOLOGY | Facility: CLINIC | Age: 70
End: 2025-04-11
Payer: COMMERCIAL

## 2025-04-11 ENCOUNTER — NON-APPOINTMENT (OUTPATIENT)
Age: 70
End: 2025-04-11

## 2025-04-11 VITALS
TEMPERATURE: 97.9 F | BODY MASS INDEX: 25.46 KG/M2 | DIASTOLIC BLOOD PRESSURE: 74 MMHG | OXYGEN SATURATION: 96 % | RESPIRATION RATE: 15 BRPM | HEART RATE: 62 BPM | SYSTOLIC BLOOD PRESSURE: 156 MMHG | WEIGHT: 168 LBS | HEIGHT: 68 IN

## 2025-04-11 VITALS — SYSTOLIC BLOOD PRESSURE: 112 MMHG | DIASTOLIC BLOOD PRESSURE: 58 MMHG

## 2025-04-11 DIAGNOSIS — I10 ESSENTIAL (PRIMARY) HYPERTENSION: ICD-10-CM

## 2025-04-11 DIAGNOSIS — G47.33 OBSTRUCTIVE SLEEP APNEA (ADULT) (PEDIATRIC): ICD-10-CM

## 2025-04-11 DIAGNOSIS — E78.5 HYPERLIPIDEMIA, UNSPECIFIED: ICD-10-CM

## 2025-04-11 DIAGNOSIS — R06.09 OTHER FORMS OF DYSPNEA: ICD-10-CM

## 2025-04-11 PROCEDURE — 93000 ELECTROCARDIOGRAM COMPLETE: CPT

## 2025-04-11 PROCEDURE — 36415 COLL VENOUS BLD VENIPUNCTURE: CPT

## 2025-04-11 PROCEDURE — 99204 OFFICE O/P NEW MOD 45 MIN: CPT

## 2025-04-11 RX ORDER — NEBIVOLOL 5 MG/1
5 TABLET ORAL DAILY
Qty: 90 | Refills: 3 | Status: ACTIVE | COMMUNITY
Start: 2025-04-11 | End: 1900-01-01

## 2025-04-11 RX ORDER — SILDENAFIL 100 MG/1
100 TABLET, FILM COATED ORAL
Refills: 0 | Status: ACTIVE | COMMUNITY

## 2025-04-14 ENCOUNTER — MESSAGE (OUTPATIENT)
Age: 70
End: 2025-04-14

## 2025-04-14 LAB
APO B SERPL-MCNC: 111 MG/DL
APO LP(A) SERPL-MCNC: 11.2 NMOL/L

## 2025-04-14 RX ORDER — ROSUVASTATIN CALCIUM 5 MG/1
5 TABLET, FILM COATED ORAL
Qty: 90 | Refills: 3 | Status: ACTIVE | COMMUNITY
Start: 2025-04-14 | End: 1900-01-01

## 2025-04-21 ENCOUNTER — APPOINTMENT (OUTPATIENT)
Dept: PULMONOLOGY | Facility: CLINIC | Age: 70
End: 2025-04-21
Payer: COMMERCIAL

## 2025-04-21 VITALS
WEIGHT: 165 LBS | BODY MASS INDEX: 25.01 KG/M2 | OXYGEN SATURATION: 96 % | HEART RATE: 54 BPM | HEIGHT: 68 IN | SYSTOLIC BLOOD PRESSURE: 158 MMHG | DIASTOLIC BLOOD PRESSURE: 79 MMHG | RESPIRATION RATE: 15 BRPM | TEMPERATURE: 97.5 F

## 2025-04-21 DIAGNOSIS — G47.31 PRIMARY CENTRAL SLEEP APNEA: ICD-10-CM

## 2025-04-21 PROCEDURE — G2211 COMPLEX E/M VISIT ADD ON: CPT | Mod: NC

## 2025-04-21 PROCEDURE — 99214 OFFICE O/P EST MOD 30 MIN: CPT | Mod: GC

## 2025-04-29 ENCOUNTER — APPOINTMENT (OUTPATIENT)
Dept: PULMONOLOGY | Facility: CLINIC | Age: 70
End: 2025-04-29
Payer: COMMERCIAL

## 2025-04-29 DIAGNOSIS — G47.33 OBSTRUCTIVE SLEEP APNEA (ADULT) (PEDIATRIC): ICD-10-CM

## 2025-04-29 PROCEDURE — 94660 CPAP INITIATION&MGMT: CPT

## 2025-05-23 ENCOUNTER — APPOINTMENT (OUTPATIENT)
Dept: CARDIOLOGY | Facility: CLINIC | Age: 70
End: 2025-05-23
Payer: COMMERCIAL

## 2025-05-23 PROCEDURE — 93306 TTE W/DOPPLER COMPLETE: CPT

## 2025-05-23 PROCEDURE — ZZZZZ: CPT

## 2025-05-23 PROCEDURE — 93015 CV STRESS TEST SUPVJ I&R: CPT

## 2025-06-03 ENCOUNTER — APPOINTMENT (OUTPATIENT)
Dept: CT IMAGING | Facility: CLINIC | Age: 70
End: 2025-06-03
Payer: COMMERCIAL

## 2025-06-03 ENCOUNTER — OUTPATIENT (OUTPATIENT)
Dept: OUTPATIENT SERVICES | Facility: HOSPITAL | Age: 70
LOS: 1 days | End: 2025-06-03
Payer: COMMERCIAL

## 2025-06-03 ENCOUNTER — TRANSCRIPTION ENCOUNTER (OUTPATIENT)
Age: 70
End: 2025-06-03

## 2025-06-03 DIAGNOSIS — I25.10 ATHEROSCLEROTIC HEART DISEASE OF NATIVE CORONARY ARTERY W/OUT ANGINA PECTORIS: ICD-10-CM

## 2025-06-03 DIAGNOSIS — R06.09 OTHER FORMS OF DYSPNEA: ICD-10-CM

## 2025-06-03 DIAGNOSIS — Z98.61 ATHEROSCLEROTIC HEART DISEASE OF NATIVE CORONARY ARTERY W/OUT ANGINA PECTORIS: ICD-10-CM

## 2025-06-03 PROCEDURE — 75574 CT ANGIO HRT W/3D IMAGE: CPT | Mod: 26

## 2025-06-03 PROCEDURE — 75574 CT ANGIO HRT W/3D IMAGE: CPT

## 2025-06-06 ENCOUNTER — OUTPATIENT (OUTPATIENT)
Dept: OUTPATIENT SERVICES | Facility: HOSPITAL | Age: 70
LOS: 1 days | End: 2025-06-06
Payer: COMMERCIAL

## 2025-06-06 ENCOUNTER — TRANSCRIPTION ENCOUNTER (OUTPATIENT)
Age: 70
End: 2025-06-06

## 2025-06-06 VITALS
HEIGHT: 68 IN | RESPIRATION RATE: 16 BRPM | SYSTOLIC BLOOD PRESSURE: 120 MMHG | WEIGHT: 164.91 LBS | TEMPERATURE: 98 F | DIASTOLIC BLOOD PRESSURE: 81 MMHG | HEART RATE: 55 BPM | OXYGEN SATURATION: 96 %

## 2025-06-06 VITALS
OXYGEN SATURATION: 99 % | SYSTOLIC BLOOD PRESSURE: 122 MMHG | DIASTOLIC BLOOD PRESSURE: 72 MMHG | HEART RATE: 58 BPM | RESPIRATION RATE: 19 BRPM

## 2025-06-06 DIAGNOSIS — I25.10 ATHEROSCLEROTIC HEART DISEASE OF NATIVE CORONARY ARTERY WITHOUT ANGINA PECTORIS: ICD-10-CM

## 2025-06-06 DIAGNOSIS — Z98.49 CATARACT EXTRACTION STATUS, UNSPECIFIED EYE: Chronic | ICD-10-CM

## 2025-06-06 LAB
ANION GAP SERPL CALC-SCNC: 12 MMOL/L — SIGNIFICANT CHANGE UP (ref 7–14)
BUN SERPL-MCNC: 21 MG/DL — SIGNIFICANT CHANGE UP (ref 7–23)
CALCIUM SERPL-MCNC: 9.1 MG/DL — SIGNIFICANT CHANGE UP (ref 8.4–10.5)
CHLORIDE SERPL-SCNC: 103 MMOL/L — SIGNIFICANT CHANGE UP (ref 98–107)
CO2 SERPL-SCNC: 25 MMOL/L — SIGNIFICANT CHANGE UP (ref 22–31)
CREAT SERPL-MCNC: 0.82 MG/DL — SIGNIFICANT CHANGE UP (ref 0.5–1.3)
EGFR: 96 ML/MIN/1.73M2 — SIGNIFICANT CHANGE UP
EGFR: 96 ML/MIN/1.73M2 — SIGNIFICANT CHANGE UP
GLUCOSE SERPL-MCNC: 89 MG/DL — SIGNIFICANT CHANGE UP (ref 70–99)
HCT VFR BLD CALC: 40.8 % — SIGNIFICANT CHANGE UP (ref 39–50)
HGB BLD-MCNC: 13.8 G/DL — SIGNIFICANT CHANGE UP (ref 13–17)
MCHC RBC-ENTMCNC: 28.8 PG — SIGNIFICANT CHANGE UP (ref 27–34)
MCHC RBC-ENTMCNC: 33.8 G/DL — SIGNIFICANT CHANGE UP (ref 32–36)
MCV RBC AUTO: 85.2 FL — SIGNIFICANT CHANGE UP (ref 80–100)
NRBC # BLD AUTO: 0 K/UL — SIGNIFICANT CHANGE UP (ref 0–0)
NRBC # FLD: 0 K/UL — SIGNIFICANT CHANGE UP (ref 0–0)
NRBC BLD AUTO-RTO: 0 /100 WBCS — SIGNIFICANT CHANGE UP (ref 0–0)
PLATELET # BLD AUTO: 243 K/UL — SIGNIFICANT CHANGE UP (ref 150–400)
POTASSIUM SERPL-MCNC: 3.9 MMOL/L — SIGNIFICANT CHANGE UP (ref 3.5–5.3)
POTASSIUM SERPL-SCNC: 3.9 MMOL/L — SIGNIFICANT CHANGE UP (ref 3.5–5.3)
RBC # BLD: 4.79 M/UL — SIGNIFICANT CHANGE UP (ref 4.2–5.8)
RBC # FLD: 12.8 % — SIGNIFICANT CHANGE UP (ref 10.3–14.5)
SODIUM SERPL-SCNC: 140 MMOL/L — SIGNIFICANT CHANGE UP (ref 135–145)
WBC # BLD: 7.1 K/UL — SIGNIFICANT CHANGE UP (ref 3.8–10.5)
WBC # FLD AUTO: 7.1 K/UL — SIGNIFICANT CHANGE UP (ref 3.8–10.5)

## 2025-06-06 PROCEDURE — 93454 CORONARY ARTERY ANGIO S&I: CPT | Mod: 26

## 2025-06-06 PROCEDURE — 93010 ELECTROCARDIOGRAM REPORT: CPT

## 2025-06-06 RX ORDER — NEBIVOLOL 2.5 MG/1
1 TABLET ORAL
Refills: 0 | DISCHARGE

## 2025-06-06 RX ORDER — ISOSORBIDE MONONITRATE 60 MG/1
1 TABLET, EXTENDED RELEASE ORAL
Qty: 30 | Refills: 6
Start: 2025-06-06 | End: 2026-01-01

## 2025-06-06 RX ORDER — ALBUTEROL SULFATE 2.5 MG/3ML
2 VIAL, NEBULIZER (ML) INHALATION
Refills: 0 | DISCHARGE

## 2025-06-06 RX ORDER — ROSUVASTATIN CALCIUM 20 MG/1
1 TABLET, FILM COATED ORAL
Refills: 0 | DISCHARGE

## 2025-06-06 RX ORDER — ASPIRIN 325 MG
1 TABLET ORAL
Refills: 0 | DISCHARGE

## 2025-06-06 RX ADMIN — Medication 1000 MILLILITER(S): at 08:18

## 2025-06-06 RX ADMIN — Medication 75 MILLILITER(S): at 09:00

## 2025-06-06 NOTE — ASU DISCHARGE PLAN (ADULT/PEDIATRIC) - FINANCIAL ASSISTANCE
[facility] provides services at a reduced cost to those who are determined to be eligible through [facility]’s financial assistance program. Information regarding [facility]’s financial assistance program can be found by going to [email or web address] or by calling [phone].

## 2025-06-06 NOTE — H&P CARDIOLOGY - HISTORY OF PRESENT ILLNESS
68 y/o M w/ PMH of HTN, preDM, CLAUDETTE on CPAP, aortic dilation and former smoker presents for cardiac catheretization. Pt admits to [facility] for the past several years which has become worse over the last 6 months. Pt's symptoms are relieved with about 30 minutes of rest. Pt was sent for an ischemic evaluation. TTE from 5/23/2025 showed normal LVEF of 63% and dilated ascending aorta measuring 4.2cm. Exercise stress from 5/23/2025 was not conclusive with 9.5 METS which is consistent with average exercise capacity but he was not able to reach 85% maximum predicted HR. Pt was then sent for CCTA showing coronary calcium score of 792, dLAD severe stenosis, mLAD moderate stenosis and ostial D1 moderate stenosis.    Referring MD: Dr. Au

## 2025-06-06 NOTE — ASU PATIENT PROFILE, ADULT - PRO ARRIVE FROM
Called patient and told her that she would like her to be seen early next week. She will call back later to make an appointment.   home

## 2025-06-06 NOTE — H&P CARDIOLOGY - NSICDXPASTMEDICALHX_GEN_ALL_CORE_FT
PAST MEDICAL HISTORY:  Aortic root dilation     HLD (hyperlipidemia)     HTN (hypertension)     CLAUDETTE on CPAP

## 2025-06-06 NOTE — ASU DISCHARGE PLAN (ADULT/PEDIATRIC) - CARE PROVIDER_API CALL
Ru Liang  Cardiovascular Disease  [address] [place] [address]  Phone: [phone]  Fax: [phone]  Established Patient  Follow Up Time: 2 weeks

## 2025-06-06 NOTE — ASU PATIENT PROFILE, ADULT - CAREGIVER RELATION TO PATIENT
December 8, 2021       Pauline Manzano MD  [address]  [facility] [address]  [facility] [address]  Via Fax: [phone]      Patient: Shree Holden   YOB: 1955   Date of Visit: 12/8/2021       Dear Dr. Manzano:    I saw your patient, Shree Holden, for an evaluation. Below are my notes for this visit with him.    If you have questions, please do not hesitate to call me.      Sincerely,        Tess Melchor DO        CC: No Recipients  Tess Melchor DO  12/8/2021  3:54 PM  Signed  Subjective   Patient ID: Shree is a 66 year old male.  Referring Physician: Pauline Manzano MD    SUBJECTIVE     Chief Complaint   Patient presents with   • Office Visit   • Consultation   • Colonoscopy     HPI  65yo male with BMI 45, CHF CKD Afib pacemaker DVT on eliquis BID presents for surveillance colonoscopy    No GI issues or complaints   No n/v  No abd pain  Maybe 1-2 loose stools once a month  No alternating diarrhea/constipation  No push/strain for bm   No black or bloody stools  No heartburn regurgitation or chest pain  No dysphagia     Last colonoscopy cannot recall exact date but says hes had polyps in the past - may have been between 5-10 years ago    Has had multiple surgeries and says hes done well with anesthesia  Open heart surgery age 19 patent duct   Has a pacemaker  Left knee lakeisha   Right knee replaced  Right shoulder surgery    Currently finishing up amoxicillin for sinusitis     Had a stress echo Monday  Went to nephrologist today    Climbs stairs daily - 2 flights  Former tobacco use  No current etoh       Past Medical History:   Diagnosis Date   • Arthritis    • Diabetes (CMS/HCC)     insulin pump     Past Surgical History:   Procedure Laterality Date   • Back surgery     • Elbow surgery Right    • Knee arthroscopy w/ laser Left     left knee ligament surgery   • Knee surgery Right    • Patent ductus arteriosis closure  age 19   • Shoulder surgery Right     rotator cuff     Current Outpatient Medications    Medication Sig Dispense Refill   • allopurinol (ZYLOPRIM) 300 MG tablet Take 300 mg by mouth daily.     • AMIODarone (PACERONE) 200 MG tablet Take 200 mg by mouth daily.     • atorvastatin (LIPITOR) 20 MG tablet Take 20 mg by mouth daily.     • carvedilol (COREG) 25 MG tablet Take 25 mg by mouth 2 times daily (with meals).     • apixaBAN (Eliquis) 5 MG Tab Take by mouth every 12 hours.     • gabapentin (NEURONTIN) 300 MG capsule Take 300 mg by mouth 3 times daily.     • insulin lispro 100 units/mL subcutaneous pump by Subcutaneous Infusion route continuous. Patient-managed pump  Pump Brand:  Outpatient pump provider:  Basal rate: units/hr  Bolus pre-meal doses:  units  Next fill date:     • furosemide (LASIX) 40 MG tablet Take 40 mg by mouth daily.     • polyethylene glycol (MIRALAX) 17 g packet Take 17 g by mouth daily. Stir and dissolve powder in any 4 to 8 ounces of beverage, then drink.     • electrolyte/PEG 3350 (TriLyte) 420 g solution Take 4,000 mLs by mouth 1 time for 1 dose. 4000 mL 0     No current facility-administered medications for this visit.       ALLERGIES:  No Known Allergies    Family History   Problem Relation Age of Onset   • Emphysema Mother    • Cancer, Rectal Neg Hx    • Cancer, Stomach Neg Hx    • Cancer, Esophageal Neg Hx    • Cancer, Colon Neg Hx        Social History     Tobacco Use   • Smoking status: Former Smoker     Types: Cigars     Quit date: 6/10/1980     Years since quittin.5   • Smokeless tobacco: Never Used   Substance Use Topics   • Alcohol use: Never   • Drug use: Never           ROS  Except as noted elsewhere in the note, the ROS is negative for Constitutional, HEENT, CV, Respiratory, GI, , Musculoskeletal, Neuro, Psychiatric, Heme/Lymph note, Allergy,Endocrine, and Skin.     OBJECTIVE     Objective   BP (!) 148/88   Pulse 69   Ht 5' 10\" (1.778 m)   Wt (!) 143.3 kg (316 lb)   BMI 45.34 kg/m²   BSA 2.53 m²   Physical Exam:  General: awake, NAD   Eyes: no scleral  spouse icterus  HENT: wearing a mask  Cardiovascular: regular rate  Respiratory: Respiratory effort normal/non labored   Gastrointestinal:  soft non distended non tender positive bs. Exam limited given habitus  Neurologic: no focal deficits, spontaneously moving all extremities  Skin: Warm and dry  Psych: alert     Labs:  No results found for: SODIUM, POTASSIUM, CHLORIDE, CO2, BUN, CREATININE, GLUCOSE  No results found for: WBC, HCT, HGB, PLT  No results found for: INR  No results found for: AST, GPT, GGTP, ALKPT, BILIRUBIN  COLONOSCOPY    (Results Pending)       Pertinent labs and imaging were personally reviewed.    Please see the chart for further details.    ASSESSMENT/RECOMMENDATIONS:     Assessment   Problem List Items Addressed This Visit        Gastrointestinal and Abdominal    Personal history of colonic polyps - Primary     He appears to have chronic illnesses that are well compensated.  His functional status appears to be satisfactory.    A colonoscopy is recommended. I explained to the patient the natural history of colon cancer and the adenoma to carcinoma transformation sequence. I explained the role of colonoscopy and the principal of removing polyps before then have a chance to turn malignant. I explained the risks/benefits/alternatives to the patient including but not limited to: bleeding, infection, perforation, missed lesions, and anesthesia/sedation-related complications; the patient verbalized understanding and agreed to proceed.      Given eliquis use - per Dr Fernandes - can continue for the procedure, and we will utilize hemostatic clips if needed         Relevant Medications    electrolyte/PEG 3350 (TriLyte) 420 g solution    Other Relevant Orders    COLONOSCOPY    2019 NOVEL CORONAVIRUS (SARS-COV-2)            Case discussed and patient evaluated with gastroenterology attending Dr. Fernandes  Please wait for their final addendum    Tess Melchor DO  Gastroenterology Fellow, PGY6  (timing of the patient  encounter may not correlate with documentation time)

## 2025-06-06 NOTE — ASU DISCHARGE PLAN (ADULT/PEDIATRIC) - ASU DC SPECIAL INSTRUCTIONSFT
WOUND CARE:  The day after your procedure:  - Remove the bandage at the site gently, clean with a small amount of soap and water, and pat try. Leave open to air.  - You may shower.  - Do not apply lotions, creams, ointments, powder, or perfumes to your incision site.  - Check your wrist or groin daily. A small amount of bruising or soreness is normal.  - Do not soak the procedural site for 1 week (no baths, pools, tubs, etc).    ACTIVITY:  For the next 24 hours:  - Do not drive or operate heavy machinery.  - Do not drink any alcoholic beverages.  - Do not make any important decisions or sign legal documents.    If your procedure was performed through the wrist,  For the next 3 days:  - Avoid pushing and pulling with the affected wrist.   - Avoid repeated movement of the affected hand and wrist (typing, hammering).  - Do not lift anything more than 5 pounds.    If your procedure was performed through the groin,  For the next 5 days:  - Limit climbing stairs.  - Do not soak in a bathtub or pool.  - Avoid strenuous activities (pushing, pulling, straining).  - Do not lift anything more than 10 pounds.    MEDICATION:   - Take your medications as explained (see attached medication reconciliation on discharge paperwork).  - If you had a stent placed, you will be taking antiplatelet medications to keep your stent open (examples: Aspirin, Plavix, Brilinta, Effient). Take your medications as prescribed. Do not stop taking them without consulting with your cardiologist/vascular surgeon first.    ADDITIONAL INSTRUCTIONS:  - Follow a heart healthy diet recommended by your doctor.   - If you smoke, we encourage smoking cessation. You may call [phone] for center of tobacco control if you need assistance.  - Call your doctor to make appointment within 2 weeks.    ***CALL YOUR DOCTOR***  - If you experience fever, chills, body aches, or severe pain, swelling, redness, heat, or yellow discharge from your incision site.  - If you notice bleeding or significant swelling at the procedural site.  - If you experience chest pain.  - If you experience extremity numbness, tingling, or temperature change.    If you are unable to get in contact with your doctor, you may contact the [facility]ventional [facility] at [phone].  Please reference your discharge instructions for any questions or concerns.

## 2025-06-06 NOTE — H&P CARDIOLOGY - NS MD HP PULSE DORSALIS
-- DO NOT REPLY / DO NOT REPLY ALL --  -- This inbox is not monitored  -- Message is from Engagement Center Operations (ECO) --    General Patient Message: Please call regarding missed call Wednesday  Caller Information         Type Contact Phone/Fax    07/05/2024 08:40 AM CDT Phone (Incoming) César Juárez (Self) [phone] (H)            Alternative phone number: no    Can a detailed message be left? Yes - Voicemail      Patient has been advised the message will be addressed within 2-3 business days.             right normal/left normal

## 2025-06-06 NOTE — H&P CARDIOLOGY - COMMENTS
Pre Procedural Sedation Evaluation    Urine pregnancy: N/A  Dentures: None  Last PO intake: NPO p MN on 6/5  Obstructive sleep apnea: Yes  Aspiration risk: No  Mallampati score: 2  ASA Classification: 2  Prior Sedative or Anesthesia Experience: No complications  Informed consent by responsible adult: Yes  Responsible adult escort: Yes  Based on today's assessment, anesthesia consult requested: Yes

## 2025-06-06 NOTE — PROGRESS NOTE ADULT - SUBJECTIVE AND OBJECTIVE BOX
The patient is a 69y Male with a PMHx significant for Atherosclerosis of native coronary artery without angina pectoris    FHx: sudden cardiac death (SCD) (Uncle)    HTN (hypertension)    HLD (hyperlipidemia)    CLAUDETTE on CPAP    Aortic root dilation    S/P cataract surgery    [email or web address]          [ x ] ASA 3       [  ]  ASA 4    [X]  Known CLAUDETTE      Allergies:No Known Allergies            [X] NPO > 8 hours        AIRWAY ASSESSMENT:    Airway:  Mal [ 3  ]    Teeth: no loose teeth    Neck:   [ x ] FROM                [  ] restricted ROM                  [  ] thick neck         [  ] short neck      [  ] micrognathic chin    T(C): 36.7 (06-06-25 @ 07:41), Max: 36.7 (06-06-25 @ 07:41)  HR: 55 (06-06-25 @ 07:41) (55 - 55)  BP: 120/81 (06-06-25 @ 07:41) (120/81 - 120/81)  RR: 16 (06-06-25 @ 07:41) (16 - 16)  SpO2: 96% (06-06-25 @ 07:41) (96% - 96%)  Wt(kg): --    Recommend:  No contraindication to conscious sedation.

## 2025-06-06 NOTE — H&P CARDIOLOGY - NSICDXFAMILYHX_GEN_ALL_CORE_FT
FAMILY HISTORY:  Uncle  Still living? Unknown  FHx: sudden cardiac death (SCD), Age at diagnosis: Age Unknown

## 2025-06-09 ENCOUNTER — TRANSCRIPTION ENCOUNTER (OUTPATIENT)
Age: 70
End: 2025-06-09

## 2025-06-10 PROBLEM — E78.5 HYPERLIPIDEMIA, UNSPECIFIED: Chronic | Status: ACTIVE | Noted: 2025-06-06

## 2025-06-10 PROBLEM — I77.810 THORACIC AORTIC ECTASIA: Chronic | Status: ACTIVE | Noted: 2025-06-06

## 2025-06-10 PROBLEM — G47.33 OBSTRUCTIVE SLEEP APNEA (ADULT) (PEDIATRIC): Chronic | Status: ACTIVE | Noted: 2025-06-06

## 2025-06-10 PROBLEM — I10 ESSENTIAL (PRIMARY) HYPERTENSION: Chronic | Status: ACTIVE | Noted: 2025-06-06

## 2025-06-12 ENCOUNTER — APPOINTMENT (OUTPATIENT)
Dept: PULMONOLOGY | Facility: CLINIC | Age: 70
End: 2025-06-12

## 2025-06-17 ENCOUNTER — TRANSCRIPTION ENCOUNTER (OUTPATIENT)
Age: 70
End: 2025-06-17

## 2025-06-23 ENCOUNTER — TRANSCRIPTION ENCOUNTER (OUTPATIENT)
Age: 70
End: 2025-06-23

## 2025-06-23 RX ORDER — ISOSORBIDE MONONITRATE 30 MG/1
30 TABLET, EXTENDED RELEASE ORAL DAILY
Qty: 90 | Refills: 3 | Status: ACTIVE | COMMUNITY
Start: 2025-06-23 | End: 2026-06-18

## 2025-06-26 ENCOUNTER — APPOINTMENT (OUTPATIENT)
Dept: CARDIOLOGY | Facility: CLINIC | Age: 70
End: 2025-06-26
Payer: COMMERCIAL

## 2025-06-26 VITALS
HEART RATE: 61 BPM | RESPIRATION RATE: 16 BRPM | WEIGHT: 167 LBS | SYSTOLIC BLOOD PRESSURE: 129 MMHG | DIASTOLIC BLOOD PRESSURE: 65 MMHG | OXYGEN SATURATION: 96 % | TEMPERATURE: 97.6 F | BODY MASS INDEX: 25.31 KG/M2 | HEIGHT: 68 IN

## 2025-06-26 PROCEDURE — 99214 OFFICE O/P EST MOD 30 MIN: CPT

## 2025-06-26 PROCEDURE — G2211 COMPLEX E/M VISIT ADD ON: CPT | Mod: NC

## 2025-06-26 RX ORDER — TELMISARTAN AND HYDROCHLOROTHIAZIDE 25; 80 MG/1; MG/1
80-25 TABLET ORAL
Refills: 0 | Status: ACTIVE | COMMUNITY

## 2025-07-01 ENCOUNTER — MESSAGE (OUTPATIENT)
Age: 70
End: 2025-07-01

## 2025-07-01 LAB
CHOLEST SERPL-MCNC: 110 MG/DL
HDLC SERPL-MCNC: 51 MG/DL
LDLC SERPL-MCNC: 36 MG/DL
NONHDLC SERPL-MCNC: 60 MG/DL
TRIGL SERPL-MCNC: 134 MG/DL

## 2025-07-08 ENCOUNTER — APPOINTMENT (OUTPATIENT)
Dept: CARDIOLOGY | Facility: CLINIC | Age: 70
End: 2025-07-08

## 2025-07-24 ENCOUNTER — TRANSCRIPTION ENCOUNTER (OUTPATIENT)
Age: 70
End: 2025-07-24

## 2025-08-01 ENCOUNTER — TRANSCRIPTION ENCOUNTER (OUTPATIENT)
Age: 70
End: 2025-08-01

## 2025-08-04 ENCOUNTER — TRANSCRIPTION ENCOUNTER (OUTPATIENT)
Age: 70
End: 2025-08-04

## 2025-08-07 ENCOUNTER — TRANSCRIPTION ENCOUNTER (OUTPATIENT)
Age: 70
End: 2025-08-07

## 2025-08-14 ENCOUNTER — APPOINTMENT (OUTPATIENT)
Dept: GASTROENTEROLOGY | Facility: CLINIC | Age: 70
End: 2025-08-14
Payer: COMMERCIAL

## 2025-08-14 VITALS
WEIGHT: 163 LBS | HEIGHT: 68 IN | TEMPERATURE: 97.6 F | SYSTOLIC BLOOD PRESSURE: 106 MMHG | DIASTOLIC BLOOD PRESSURE: 68 MMHG | OXYGEN SATURATION: 96 % | BODY MASS INDEX: 24.71 KG/M2 | HEART RATE: 62 BPM

## 2025-08-14 DIAGNOSIS — Z12.11 ENCOUNTER FOR SCREENING FOR MALIGNANT NEOPLASM OF COLON: ICD-10-CM

## 2025-08-14 DIAGNOSIS — K21.9 GASTRO-ESOPHAGEAL REFLUX DISEASE W/OUT ESOPHAGITIS: ICD-10-CM

## 2025-08-14 PROCEDURE — 99203 OFFICE O/P NEW LOW 30 MIN: CPT

## 2025-08-14 RX ORDER — SODIUM SULFATE, MAGNESIUM SULFATE, AND POTASSIUM CHLORIDE 17.75; 2.7; 2.25 G/1; G/1; G/1
1479-225-188 TABLET ORAL
Qty: 1 | Refills: 0 | Status: ACTIVE | COMMUNITY
Start: 2025-08-14 | End: 1900-01-01

## 2025-08-14 RX ORDER — SODIUM PICOSULFATE, MAGNESIUM OXIDE, AND ANHYDROUS CITRIC ACID 12; 3.5; 1 G/175ML; G/175ML; MG/175ML
10-3.5-12 MG-GM LIQUID ORAL
Qty: 2 | Refills: 0 | Status: ACTIVE | COMMUNITY
Start: 2025-08-14 | End: 1900-01-01

## 2025-08-14 RX ORDER — SODIUM SULFATE, POTASSIUM SULFATE AND MAGNESIUM SULFATE 1.6; 3.13; 17.5 G/177ML; G/177ML; G/177ML
17.5-3.13-1.6 SOLUTION ORAL
Qty: 1 | Refills: 0 | Status: ACTIVE | COMMUNITY
Start: 2025-08-14 | End: 1900-01-01

## 2025-09-03 ENCOUNTER — APPOINTMENT (OUTPATIENT)
Dept: GASTROENTEROLOGY | Facility: AMBULATORY MEDICAL SERVICES | Age: 70
End: 2025-09-03
Payer: COMMERCIAL

## 2025-09-03 PROCEDURE — 45380 COLONOSCOPY AND BIOPSY: CPT

## 2025-09-08 ENCOUNTER — NON-APPOINTMENT (OUTPATIENT)
Age: 70
End: 2025-09-08

## 2025-09-08 ENCOUNTER — APPOINTMENT (OUTPATIENT)
Dept: OPHTHALMOLOGY | Facility: CLINIC | Age: 70
End: 2025-09-08
Payer: COMMERCIAL

## 2025-09-08 PROCEDURE — 92201 OPSCPY EXTND RTA DRAW UNI/BI: CPT

## 2025-09-08 PROCEDURE — 92134 CPTRZ OPH DX IMG PST SGM RTA: CPT

## 2025-09-08 PROCEDURE — 92014 COMPRE OPH EXAM EST PT 1/>: CPT

## 2025-09-16 ENCOUNTER — NON-APPOINTMENT (OUTPATIENT)
Age: 70
End: 2025-09-16

## 2025-09-17 ENCOUNTER — TRANSCRIPTION ENCOUNTER (OUTPATIENT)
Age: 70
End: 2025-09-17